# Patient Record
Sex: MALE | Race: WHITE | NOT HISPANIC OR LATINO | Employment: FULL TIME | URBAN - METROPOLITAN AREA
[De-identification: names, ages, dates, MRNs, and addresses within clinical notes are randomized per-mention and may not be internally consistent; named-entity substitution may affect disease eponyms.]

---

## 2021-05-24 ENCOUNTER — HOSPITAL ENCOUNTER (INPATIENT)
Facility: HOSPITAL | Age: 21
LOS: 1 days | Discharge: HOME | End: 2021-05-25
Attending: EMERGENCY MEDICINE | Admitting: INTERNAL MEDICINE

## 2021-05-24 ENCOUNTER — APPOINTMENT (OUTPATIENT)
Dept: MRI IMAGING | Facility: HOSPITAL | Age: 21
End: 2021-05-24

## 2021-05-24 ENCOUNTER — APPOINTMENT (OUTPATIENT)
Dept: CT IMAGING | Facility: HOSPITAL | Age: 21
End: 2021-05-24

## 2021-05-24 DIAGNOSIS — Q28.3: Primary | ICD-10-CM

## 2021-05-24 DIAGNOSIS — R51.9 FRONTAL HEADACHE: ICD-10-CM

## 2021-05-24 DIAGNOSIS — I62.9: Primary | ICD-10-CM

## 2021-05-24 DIAGNOSIS — H53.8 BLURRED VISION, LEFT EYE: ICD-10-CM

## 2021-05-24 LAB
ALBUMIN SERPL-MCNC: 4.6 G/DL (ref 3.5–5.2)
ALBUMIN/GLOB SERPL: 1.8 G/DL
ALP SERPL-CCNC: 57 U/L (ref 39–117)
ALT SERPL W P-5'-P-CCNC: 15 U/L (ref 1–41)
ANION GAP SERPL CALCULATED.3IONS-SCNC: 8 MMOL/L (ref 5–15)
AST SERPL-CCNC: 23 U/L (ref 1–40)
BASOPHILS # BLD AUTO: 0.02 10*3/MM3 (ref 0–0.2)
BASOPHILS NFR BLD AUTO: 0.4 % (ref 0–1.5)
BILIRUB SERPL-MCNC: 0.5 MG/DL (ref 0–1.2)
BUN SERPL-MCNC: 7 MG/DL (ref 6–20)
BUN/CREAT SERPL: 8.1 (ref 7–25)
CALCIUM SPEC-SCNC: 9.7 MG/DL (ref 8.6–10.5)
CHLORIDE SERPL-SCNC: 105 MMOL/L (ref 98–107)
CO2 SERPL-SCNC: 28 MMOL/L (ref 22–29)
CREAT SERPL-MCNC: 0.86 MG/DL (ref 0.76–1.27)
DEPRECATED RDW RBC AUTO: 37.7 FL (ref 37–54)
EOSINOPHIL # BLD AUTO: 0.15 10*3/MM3 (ref 0–0.4)
EOSINOPHIL NFR BLD AUTO: 2.9 % (ref 0.3–6.2)
ERYTHROCYTE [DISTWIDTH] IN BLOOD BY AUTOMATED COUNT: 11.8 % (ref 12.3–15.4)
GFR SERPL CREATININE-BSD FRML MDRD: 111 ML/MIN/1.73
GLOBULIN UR ELPH-MCNC: 2.5 GM/DL
GLUCOSE SERPL-MCNC: 99 MG/DL (ref 65–99)
HCT VFR BLD AUTO: 43.1 % (ref 37.5–51)
HGB BLD-MCNC: 14.9 G/DL (ref 13–17.7)
IMM GRANULOCYTES # BLD AUTO: 0 10*3/MM3 (ref 0–0.05)
IMM GRANULOCYTES NFR BLD AUTO: 0 % (ref 0–0.5)
INR PPP: 0.94 (ref 0.85–1.16)
LYMPHOCYTES # BLD AUTO: 2.21 10*3/MM3 (ref 0.7–3.1)
LYMPHOCYTES NFR BLD AUTO: 42.6 % (ref 19.6–45.3)
MCH RBC QN AUTO: 30.5 PG (ref 26.6–33)
MCHC RBC AUTO-ENTMCNC: 34.6 G/DL (ref 31.5–35.7)
MCV RBC AUTO: 88.1 FL (ref 79–97)
MONOCYTES # BLD AUTO: 0.46 10*3/MM3 (ref 0.1–0.9)
MONOCYTES NFR BLD AUTO: 8.9 % (ref 5–12)
NEUTROPHILS NFR BLD AUTO: 2.35 10*3/MM3 (ref 1.7–7)
NEUTROPHILS NFR BLD AUTO: 45.2 % (ref 42.7–76)
NRBC BLD AUTO-RTO: 0 /100 WBC (ref 0–0.2)
PLATELET # BLD AUTO: 216 10*3/MM3 (ref 140–450)
PMV BLD AUTO: 8.7 FL (ref 6–12)
POTASSIUM SERPL-SCNC: 4.2 MMOL/L (ref 3.5–5.2)
PROT SERPL-MCNC: 7.1 G/DL (ref 6–8.5)
PROTHROMBIN TIME: 12.3 SECONDS (ref 11.4–14.4)
RBC # BLD AUTO: 4.89 10*6/MM3 (ref 4.14–5.8)
SODIUM SERPL-SCNC: 141 MMOL/L (ref 136–145)
WBC # BLD AUTO: 5.19 10*3/MM3 (ref 3.4–10.8)

## 2021-05-24 PROCEDURE — A9577 INJ MULTIHANCE: HCPCS | Performed by: EMERGENCY MEDICINE

## 2021-05-24 PROCEDURE — 85025 COMPLETE CBC W/AUTO DIFF WBC: CPT | Performed by: PHYSICIAN ASSISTANT

## 2021-05-24 PROCEDURE — 80053 COMPREHEN METABOLIC PANEL: CPT | Performed by: PHYSICIAN ASSISTANT

## 2021-05-24 PROCEDURE — 70450 CT HEAD/BRAIN W/O DYE: CPT

## 2021-05-24 PROCEDURE — 85610 PROTHROMBIN TIME: CPT | Performed by: PHYSICIAN ASSISTANT

## 2021-05-24 PROCEDURE — 99284 EMERGENCY DEPT VISIT MOD MDM: CPT

## 2021-05-24 PROCEDURE — 0 GADOBENATE DIMEGLUMINE 529 MG/ML SOLUTION: Performed by: EMERGENCY MEDICINE

## 2021-05-24 PROCEDURE — 70553 MRI BRAIN STEM W/O & W/DYE: CPT

## 2021-05-24 RX ORDER — ACETAMINOPHEN 500 MG
1000 TABLET ORAL ONCE
Status: COMPLETED | OUTPATIENT
Start: 2021-05-24 | End: 2021-05-24

## 2021-05-24 RX ORDER — IBUPROFEN 600 MG/1
600 TABLET ORAL ONCE
Status: COMPLETED | OUTPATIENT
Start: 2021-05-24 | End: 2021-05-24

## 2021-05-24 RX ORDER — SODIUM CHLORIDE 0.9 % (FLUSH) 0.9 %
10 SYRINGE (ML) INJECTION AS NEEDED
Status: DISCONTINUED | OUTPATIENT
Start: 2021-05-24 | End: 2021-05-25 | Stop reason: HOSPADM

## 2021-05-24 RX ADMIN — ACETAMINOPHEN 1000 MG: 500 TABLET, FILM COATED ORAL at 20:05

## 2021-05-24 RX ADMIN — GADOBENATE DIMEGLUMINE 17 ML: 529 INJECTION, SOLUTION INTRAVENOUS at 22:32

## 2021-05-24 RX ADMIN — IBUPROFEN 600 MG: 600 TABLET ORAL at 20:05

## 2021-05-25 VITALS
DIASTOLIC BLOOD PRESSURE: 79 MMHG | TEMPERATURE: 97.9 F | WEIGHT: 185.25 LBS | SYSTOLIC BLOOD PRESSURE: 123 MMHG | RESPIRATION RATE: 16 BRPM | BODY MASS INDEX: 23.77 KG/M2 | OXYGEN SATURATION: 98 % | HEIGHT: 74 IN | HEART RATE: 83 BPM

## 2021-05-25 PROBLEM — Z72.0 TOBACCO USE: Status: ACTIVE | Noted: 2021-05-25

## 2021-05-25 PROBLEM — D18.00 ANGIOMA: Status: ACTIVE | Noted: 2021-05-25

## 2021-05-25 LAB
ANION GAP SERPL CALCULATED.3IONS-SCNC: 7 MMOL/L (ref 5–15)
BUN SERPL-MCNC: 7 MG/DL (ref 6–20)
BUN/CREAT SERPL: 8.8 (ref 7–25)
CALCIUM SPEC-SCNC: 9.3 MG/DL (ref 8.6–10.5)
CHLORIDE SERPL-SCNC: 106 MMOL/L (ref 98–107)
CO2 SERPL-SCNC: 28 MMOL/L (ref 22–29)
CREAT SERPL-MCNC: 0.8 MG/DL (ref 0.76–1.27)
DEPRECATED RDW RBC AUTO: 38.1 FL (ref 37–54)
ERYTHROCYTE [DISTWIDTH] IN BLOOD BY AUTOMATED COUNT: 11.8 % (ref 12.3–15.4)
FLUAV RNA RESP QL NAA+PROBE: NOT DETECTED
FLUBV RNA RESP QL NAA+PROBE: NOT DETECTED
GFR SERPL CREATININE-BSD FRML MDRD: 121 ML/MIN/1.73
GLUCOSE SERPL-MCNC: 100 MG/DL (ref 65–99)
HCT VFR BLD AUTO: 38.5 % (ref 37.5–51)
HGB BLD-MCNC: 13.3 G/DL (ref 13–17.7)
MCH RBC QN AUTO: 31.1 PG (ref 26.6–33)
MCHC RBC AUTO-ENTMCNC: 34.5 G/DL (ref 31.5–35.7)
MCV RBC AUTO: 90.2 FL (ref 79–97)
PLATELET # BLD AUTO: 175 10*3/MM3 (ref 140–450)
PMV BLD AUTO: 9 FL (ref 6–12)
POTASSIUM SERPL-SCNC: 3.9 MMOL/L (ref 3.5–5.2)
RBC # BLD AUTO: 4.27 10*6/MM3 (ref 4.14–5.8)
SARS-COV-2 RNA RESP QL NAA+PROBE: NOT DETECTED
SODIUM SERPL-SCNC: 141 MMOL/L (ref 136–145)
WBC # BLD AUTO: 6.47 10*3/MM3 (ref 3.4–10.8)

## 2021-05-25 PROCEDURE — 99219 PR INITIAL OBSERVATION CARE/DAY 50 MINUTES: CPT | Performed by: INTERNAL MEDICINE

## 2021-05-25 PROCEDURE — 85027 COMPLETE CBC AUTOMATED: CPT | Performed by: NURSE PRACTITIONER

## 2021-05-25 PROCEDURE — 87636 SARSCOV2 & INF A&B AMP PRB: CPT | Performed by: PHYSICIAN ASSISTANT

## 2021-05-25 PROCEDURE — 80048 BASIC METABOLIC PNL TOTAL CA: CPT | Performed by: NURSE PRACTITIONER

## 2021-05-25 PROCEDURE — 99253 IP/OBS CNSLTJ NEW/EST LOW 45: CPT | Performed by: PHYSICIAN ASSISTANT

## 2021-05-25 PROCEDURE — 99217 PR OBSERVATION CARE DISCHARGE MANAGEMENT: CPT | Performed by: INTERNAL MEDICINE

## 2021-05-25 RX ORDER — ACETAMINOPHEN 325 MG/1
650 TABLET ORAL EVERY 4 HOURS PRN
Status: DISCONTINUED | OUTPATIENT
Start: 2021-05-25 | End: 2021-05-25 | Stop reason: HOSPADM

## 2021-05-25 RX ORDER — SODIUM CHLORIDE 0.9 % (FLUSH) 0.9 %
10 SYRINGE (ML) INJECTION AS NEEDED
Status: DISCONTINUED | OUTPATIENT
Start: 2021-05-25 | End: 2021-05-25 | Stop reason: HOSPADM

## 2021-05-25 RX ORDER — ACETAMINOPHEN 160 MG/5ML
650 SOLUTION ORAL EVERY 4 HOURS PRN
Status: DISCONTINUED | OUTPATIENT
Start: 2021-05-25 | End: 2021-05-25 | Stop reason: HOSPADM

## 2021-05-25 RX ORDER — ACETAMINOPHEN 650 MG/1
650 SUPPOSITORY RECTAL EVERY 4 HOURS PRN
Status: DISCONTINUED | OUTPATIENT
Start: 2021-05-25 | End: 2021-05-25 | Stop reason: HOSPADM

## 2021-05-25 RX ORDER — SODIUM CHLORIDE 0.9 % (FLUSH) 0.9 %
10 SYRINGE (ML) INJECTION EVERY 12 HOURS SCHEDULED
Status: DISCONTINUED | OUTPATIENT
Start: 2021-05-25 | End: 2021-05-25 | Stop reason: HOSPADM

## 2021-05-25 RX ADMIN — SODIUM CHLORIDE, PRESERVATIVE FREE 10 ML: 5 INJECTION INTRAVENOUS at 08:22

## 2021-07-08 ENCOUNTER — OFFICE VISIT (OUTPATIENT)
Dept: FAMILY MEDICINE CLINIC | Facility: CLINIC | Age: 21
End: 2021-07-08

## 2021-07-08 VITALS
SYSTOLIC BLOOD PRESSURE: 112 MMHG | DIASTOLIC BLOOD PRESSURE: 68 MMHG | BODY MASS INDEX: 24 KG/M2 | OXYGEN SATURATION: 98 % | RESPIRATION RATE: 16 BRPM | HEART RATE: 61 BPM | HEIGHT: 74 IN | WEIGHT: 187 LBS | TEMPERATURE: 98.5 F

## 2021-07-08 DIAGNOSIS — R41.840 ATTENTION AND CONCENTRATION DEFICIT: ICD-10-CM

## 2021-07-08 DIAGNOSIS — D18.02 VENOUS ANGIOMA OF BRAIN (HCC): Primary | ICD-10-CM

## 2021-07-08 PROCEDURE — 99213 OFFICE O/P EST LOW 20 MIN: CPT | Performed by: FAMILY MEDICINE

## 2021-07-08 NOTE — PROGRESS NOTES
Subjective   Vincent Hartman is a 21 y.o. male    Chief Complaint    Establish care   Health maintenance evaluation     History of Present Illness  The patient reports he transferred to the [facility] [place] and his unit is over in [place]. He reports he has been doing a lot of paperwork. He would like to get his college degree in IT.     On 05/26/2021 the patient noticed his vision was not right. He was seen by Dr. Jefe Reynoso and diagnosed with a venous angioma. The patient was told unless something else happens he does not need follow-up and could resume normal activities.     The patient asked where he could be tested for ADHD.     He reports approximately a week and a half ago he was having some swelling in the anterior aspect of his neck in the area of his lymph nodes.    The following portions of the patient's history were reviewed and updated as appropriate: allergies, current medications, past social history and problem list    Review of Systems   Constitutional: Negative for activity change, appetite change, fatigue and unexpected weight change.   HENT: Negative for congestion, dental problem, postnasal drip, sinus pressure and sore throat.    Eyes: Negative for photophobia, pain and visual disturbance.   Respiratory: Negative for chest tightness.    Cardiovascular: Negative for chest pain and palpitations.   Gastrointestinal: Negative for nausea and vomiting.   Neurological: Positive for headaches. Negative for dizziness, syncope, facial asymmetry, speech difficulty, weakness, light-headedness and numbness.   Psychiatric/Behavioral: Positive for decreased concentration. Negative for agitation, behavioral problems, confusion, dysphoric mood and sleep disturbance. The patient is not nervous/anxious and is not hyperactive.        Objective     Vitals:    07/08/21 1259   BP: 112/68   Pulse: 61   Resp: 16   Temp: 98.5 °F (36.9 °C)   SpO2: 98%       Physical Exam  Vitals and nursing note reviewed.    Constitutional:       Appearance: He is well-developed.   HENT:      Head: Normocephalic and atraumatic.   Eyes:      Conjunctiva/sclera: Conjunctivae normal.      Pupils: Pupils are equal, round, and reactive to light.   Cardiovascular:      Rate and Rhythm: Normal rate and regular rhythm.      Heart sounds: Normal heart sounds.   Pulmonary:      Effort: Pulmonary effort is normal.   Musculoskeletal:      Cervical back: Normal range of motion and neck supple.   Neurological:      Mental Status: He is alert and oriented to person, place, and time.      Cranial Nerves: No cranial nerve deficit.      Sensory: No sensory deficit.   Psychiatric:         Attention and Perception: He is attentive.         Speech: Speech normal.         Behavior: Behavior normal.         Thought Content: Thought content normal.         Judgment: Judgment normal.         Assessment/Plan   Problems Addressed this Visit     None      Visit Diagnoses     Venous angioma of brain (CMS/[facility])    -  Primary    Attention and concentration deficit          Diagnoses       Codes Comments    Venous angioma of brain (CMS/HCC)    -  Primary ICD-10-CM: D18.02  ICD-9-CM: 228.02     Attention and concentration deficit     ICD-10-CM: R41.840  ICD-9-CM: 799.51         Recommend formal psychological testing to rule out treatable ADD.  Patient to try to contact [facility].  If he needs a referral he is to notify us.       Scribed for SAHRA Castorena MD by Tish Weeks.  07/08/21   13:53 EDT    I have personally performed the services described in this document as scribed by the above individual, and it is both accurate and complete.  SAHRA Casotrena MD  7/8/2021  16:40 EDT

## 2022-01-06 ENCOUNTER — OFFICE VISIT (OUTPATIENT)
Dept: FAMILY MEDICINE CLINIC | Facility: CLINIC | Age: 22
End: 2022-01-06

## 2022-01-06 VITALS
OXYGEN SATURATION: 99 % | WEIGHT: 203.2 LBS | HEART RATE: 111 BPM | SYSTOLIC BLOOD PRESSURE: 122 MMHG | TEMPERATURE: 100.2 F | BODY MASS INDEX: 26.08 KG/M2 | DIASTOLIC BLOOD PRESSURE: 76 MMHG | RESPIRATION RATE: 14 BRPM | HEIGHT: 74 IN

## 2022-01-06 DIAGNOSIS — J01.00 ACUTE NON-RECURRENT MAXILLARY SINUSITIS: Primary | ICD-10-CM

## 2022-01-06 DIAGNOSIS — R50.9 FEVER, UNSPECIFIED FEVER CAUSE: ICD-10-CM

## 2022-01-06 PROCEDURE — 99213 OFFICE O/P EST LOW 20 MIN: CPT | Performed by: PHYSICIAN ASSISTANT

## 2022-01-06 RX ORDER — PREDNISONE 20 MG/1
20 TABLET ORAL 2 TIMES DAILY
Qty: 14 TABLET | Refills: 0 | Status: SHIPPED | OUTPATIENT
Start: 2022-01-06 | End: 2023-03-10

## 2022-01-06 RX ORDER — CEFDINIR 300 MG/1
300 CAPSULE ORAL 2 TIMES DAILY
Qty: 20 CAPSULE | Refills: 0 | Status: SHIPPED | OUTPATIENT
Start: 2022-01-06 | End: 2023-03-10

## 2022-01-06 NOTE — PROGRESS NOTES
Subjective   Vincent Hartman is a 21 y.o. male  Fever (102-103), Generalized Body Aches, Headache, and Fatigue      History of Present Illness  Patient is a pleasant 19-year-old white male who comes in complaining of fever chills body aches cough is productive with yellow sputum patient had a fever over 100 he is not been tested for Covid but has had his vaccine, mild sore throat nasal pressure nasal congestion no shortness of breath no chest pain  The following portions of the patient's history were reviewed and updated as appropriate: allergies, current medications, past social history and problem list    Review of Systems   Constitutional: Negative for chills, fatigue and fever.   HENT: Positive for congestion, ear pain, postnasal drip, rhinorrhea and sinus pressure. Negative for sore throat.    Eyes: Positive for discharge and itching. Negative for pain.   Respiratory: Positive for cough. Negative for shortness of breath.    Gastrointestinal: Negative.    Genitourinary: Negative.    Musculoskeletal: Negative for myalgias.   Neurological: Positive for light-headedness and headaches. Negative for dizziness.   Hematological: Negative for adenopathy.   Psychiatric/Behavioral: Positive for sleep disturbance.       Objective     Vitals:    01/06/22 0942   BP: 122/76   Pulse: 111   Resp: 14   Temp: 100.2 °F (37.9 °C)   SpO2: 99%       Physical Exam  Vitals and nursing note reviewed.   Constitutional:       Appearance: He is well-developed.   HENT:      Head: Normocephalic and atraumatic.      Right Ear: Tympanic membrane and ear canal normal.      Left Ear: Tympanic membrane and ear canal normal.      Nose: Mucosal edema and rhinorrhea present.      Right Sinus: Maxillary sinus tenderness and frontal sinus tenderness present.      Left Sinus: Maxillary sinus tenderness and frontal sinus tenderness present.      Mouth/Throat:      Pharynx: No oropharyngeal exudate.   Eyes:      Pupils: Pupils are equal, round, and  reactive to light.   Cardiovascular:      Rate and Rhythm: Normal rate and regular rhythm.   Pulmonary:      Effort: Pulmonary effort is normal.      Breath sounds: Normal breath sounds.         Assessment/Plan     Diagnoses and all orders for this visit:    1. Acute non-recurrent maxillary sinusitis (Primary)  -     cefdinir (OMNICEF) 300 MG capsule; Take 1 capsule by mouth 2 (Two) Times a Day.  Dispense: 20 capsule; Refill: 0  -     predniSONE (DELTASONE) 20 MG tablet; Take 1 tablet by mouth 2 (Two) Times a Day.  Dispense: 14 tablet; Refill: 0    2. Fever, unspecified fever cause     I spent 15 minutes in patient care: Reviewing records prior to the visit, examining the patient, entering orders and documentation    Part of this note may be an electronic transcription/translation of spoken language to printed text using the Dragon Dictation System.

## 2023-03-10 ENCOUNTER — OFFICE VISIT (OUTPATIENT)
Dept: FAMILY MEDICINE CLINIC | Facility: CLINIC | Age: 23
End: 2023-03-10
Payer: OTHER GOVERNMENT

## 2023-03-10 VITALS
DIASTOLIC BLOOD PRESSURE: 74 MMHG | HEART RATE: 69 BPM | RESPIRATION RATE: 15 BRPM | WEIGHT: 196.4 LBS | HEIGHT: 74 IN | BODY MASS INDEX: 25.21 KG/M2 | OXYGEN SATURATION: 98 % | SYSTOLIC BLOOD PRESSURE: 120 MMHG | TEMPERATURE: 97.5 F

## 2023-03-10 DIAGNOSIS — D18.02 VENOUS ANGIOMA OF BRAIN: Primary | ICD-10-CM

## 2023-03-10 PROCEDURE — 99213 OFFICE O/P EST LOW 20 MIN: CPT | Performed by: FAMILY MEDICINE

## 2023-03-13 NOTE — PROGRESS NOTES
Subjective   Vincent Hartman is a 23 y.o. male    Chief Complaint    History of cerebral hemorrhage    History of Present Illness  The patient has a history of a spontaneous intracranial bleed that was described as quite small, although he was symptomatic with some vision symptoms and headache at the time. It was determined that this was a small venous angioma that required no further intervention. 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 My suggestion is consultation again with the neurosurgery group, and they can either do an another scan or make whatever determination is necessary to give permission for him to be deployed.    The patient said they need to know that he is all clear, but the issue is over at [facility], from his understanding their doctors are not authorized to do it, and he has to go through primary medical care. Because of that, they are just trying to get this knocked out in advance, so they can submit it because it takes approximately 1.5 months for any medical paper to go through the army. The patient said he has not had any headaches. The patient is active duty currently.     The following portions of the patient's history were reviewed and updated as appropriate: allergies, current medications, past social history and problem list    Review of Systems   Constitutional: Negative for fatigue and unexpected weight change.   HENT: Negative for congestion, dental problem, postnasal drip, sinus pressure and sore throat.    Eyes: Negative for photophobia, pain and visual disturbance.   Gastrointestinal: Negative for nausea and vomiting.   Neurological: Positive for headaches. Negative for dizziness, syncope, facial asymmetry, speech difficulty, weakness, light-headedness and numbness.   Psychiatric/Behavioral: Negative for agitation, confusion, dysphoric mood and sleep disturbance. The patient is  not nervous/anxious.        Objective     Vitals:    03/10/23 1524   BP: 120/74   Pulse: 69   Resp: 15   Temp: 97.5 °F (36.4 °C)   SpO2: 98%       Physical Exam  Vitals and nursing note reviewed.   Constitutional:       General: He is not in acute distress.     Appearance: Normal appearance. He is well-developed. He is not ill-appearing, toxic-appearing or diaphoretic.   HENT:      Head: Normocephalic and atraumatic.   Eyes:      Conjunctiva/sclera: Conjunctivae normal.      Pupils: Pupils are equal, round, and reactive to light.   Cardiovascular:      Rate and Rhythm: Normal rate and regular rhythm.   Pulmonary:      Effort: Pulmonary effort is normal.   Musculoskeletal:      Cervical back: Normal range of motion and neck supple. No rigidity.   Neurological:      Mental Status: He is alert and oriented to person, place, and time.      Cranial Nerves: No cranial nerve deficit.      Sensory: No sensory deficit.      Motor: No weakness.      Coordination: Coordination normal.   Psychiatric:         Mood and Affect: Mood normal.         Speech: Speech normal.         Behavior: Behavior normal.         Thought Content: Thought content normal.         Judgment: Judgment normal.         Assessment & Plan   Problems Addressed this Visit    None  Visit Diagnoses     Venous angioma of brain (HCC)    -  Primary    Relevant Orders    Ambulatory Referral to Neurosurgery      Diagnoses       Codes Comments    Venous angioma of brain (HCC)    -  Primary ICD-10-CM: D18.02  ICD-9-CM: 228.02         I spent 15 minutes in patient care: Reviewing records prior to the visit, examining the patient, entering orders and documentation    Part of this note may be an electronic transcription/translation of spoken language to printed text using the Dragon Dictation System.    Transcribed from ambient dictation for SAHRA Castorena MD by Madelyn Akbar.  03/13/23   09:16 EDT    Patient or patient representative verbalized consent to the  visit recording.  I have personally performed the services described in this document as transcribed by the above individual, and it is both accurate and complete.

## 2023-03-14 ENCOUNTER — TELEPHONE (OUTPATIENT)
Dept: FAMILY MEDICINE CLINIC | Facility: CLINIC | Age: 23
End: 2023-03-14
Payer: OTHER GOVERNMENT

## 2023-03-14 ENCOUNTER — TRANSCRIBE ORDERS (OUTPATIENT)
Dept: FAMILY MEDICINE CLINIC | Facility: CLINIC | Age: 23
End: 2023-03-14
Payer: OTHER GOVERNMENT

## 2023-03-14 DIAGNOSIS — D18.02 VENOUS ANGIOMA OF BRAIN: Primary | ICD-10-CM

## 2023-03-29 ENCOUNTER — HOSPITAL ENCOUNTER (OUTPATIENT)
Dept: MRI IMAGING | Facility: HOSPITAL | Age: 23
Discharge: HOME | End: 2023-03-29
Admitting: FAMILY MEDICINE
Payer: OTHER GOVERNMENT

## 2023-03-29 DIAGNOSIS — D18.02 VENOUS ANGIOMA OF BRAIN: ICD-10-CM

## 2023-03-29 PROCEDURE — 70553 MRI BRAIN STEM W/O & W/DYE: CPT

## 2023-03-29 PROCEDURE — A9577 INJ MULTIHANCE: HCPCS | Performed by: FAMILY MEDICINE

## 2023-03-29 PROCEDURE — 0 GADOBENATE DIMEGLUMINE 529 MG/ML SOLUTION: Performed by: FAMILY MEDICINE

## 2023-03-29 RX ADMIN — GADOBENATE DIMEGLUMINE 18 ML: 529 INJECTION, SOLUTION INTRAVENOUS at 11:57

## 2023-03-30 ENCOUNTER — TELEPHONE (OUTPATIENT)
Dept: FAMILY MEDICINE CLINIC | Facility: CLINIC | Age: 23
End: 2023-03-30
Payer: OTHER GOVERNMENT

## 2023-03-30 NOTE — TELEPHONE ENCOUNTER
----- Message from Vinay Castorena MD sent at 3/29/2023  2:09 PM EDT -----  Here is the MRI on Vincent.  Hopefully they will see him in neurosurgery soon

## 2023-04-12 ENCOUNTER — OFFICE VISIT (OUTPATIENT)
Dept: NEUROSURGERY | Facility: CLINIC | Age: 23
End: 2023-04-12
Payer: OTHER GOVERNMENT

## 2023-04-12 VITALS — BODY MASS INDEX: 25.1 KG/M2 | WEIGHT: 195.6 LBS | HEIGHT: 74 IN

## 2023-04-12 DIAGNOSIS — Q28.3 CAVERNOUS MALFORMATION: Primary | ICD-10-CM

## 2023-04-12 PROCEDURE — 99214 OFFICE O/P EST MOD 30 MIN: CPT | Performed by: NEUROLOGICAL SURGERY

## 2023-04-12 NOTE — PROGRESS NOTES
"Subjective     Chief Complaint: Abnormal brain MRI    Patient ID: Vincent Hartman is a 23 y.o. male seen for consultation today at the request of  Vinay Light*    History of Present Illness    This is a 23-year-old man with a known history of a left posterior frontal lobe cavernous malformation.  This was discovered in 2021 when he presented to the emergency department with sudden onset headache and visual changes.  He has not had any similar episodes since that time.  He denies any strokelike symptoms, or seizures.  The reason he was referred to my office is because the surgeon and his [employer] unit was concerned that there had not been any follow-up for his abnormal brain MRI and he refused to clear him for deployment until he visited with a neurologist/neurosurgeon.  He does not have any significant medical comorbidities.    The following portions of the patient's history were reviewed and updated as appropriate: allergies, current medications, past family history, past medical history, past social history, past surgical history and problem list.    Family history:   Family History   Problem Relation Age of Onset   • No Known Problems Mother        Social history:   Social History     Socioeconomic History   • Marital status: Single   Tobacco Use   • Smoking status: Never   • Smokeless tobacco: Former     Types: Snuff     Quit date: 2023   • Tobacco comments:     1/8 can    Vaping Use   • Vaping Use: Never used   Substance and Sexual Activity   • Alcohol use: Not Currently     Alcohol/week: 0.0 standard drinks     Comment: 2 per week   • Drug use: Never   • Sexual activity: Not Currently     Partners: Female     Birth control/protection: Condom, Spermicide       Review of Systems    Objective   Height 188 cm (74\"), weight 88.7 kg (195 lb 9.6 oz).  Body mass index is 25.11 kg/m².    Physical Exam  Vitals reviewed.   Constitutional:       General: He is not in acute distress.     Appearance: He " is well-developed. He is not diaphoretic.   HENT:      Head: Normocephalic and atraumatic.   Pulmonary:      Effort: Pulmonary effort is normal.   Skin:     General: Skin is warm and dry.   Neurological:      Mental Status: He is alert and oriented to person, place, and time.      Comments: He is conversant, pleasant, appropriate, and a good historian.  He has no pronator drift.  Finger-nose testing is performed without dysmetria. cranial nerves II through XII are grossly intact.  Casual gait is unremarkable, and station is intact.   Psychiatric:         Behavior: Behavior normal.         Assessment & Plan     Independent Review of Radiographic Studies:      Available for my review is a MRI of the brain which was performed on March 29, 2023.  A comparison study from 5/24/2021 is also available for my review.  There is a small lesion situated within the subcortical white matter in the posterior left frontal lobe.  There is associated contrast-enhancement and a characteristic spider angioma type pattern.  This is entirely consistent with a cavernous malformation.  There is some susceptibility artifact on the GRE sequences suggestive of a prior hemorrhage.  Otherwise, I do not appreciate any concerning or pathological findings on this MRI.  There is no appreciable change in comparison to the MRI from 2 years ago    Medical Decision Making:      This is a 23-year-old man with a cavernous malformation that appears to have had at least one bleeding episode back in 2021.  There is no indication for surgical intervention.  There is no need for surveillance imaging.  From my perspective, he is cleared to resume all normal activities including any activities that might be required of him for his [employer] service.  I would be happy to follow-up with him on an as-needed basis moving forward.  Signs and symptoms of intracranial hemorrhage, stroke, mass effect, and cortical irritation were discussed with the  patient    Diagnoses and all orders for this visit:    1. Cavernous malformation (Primary)        No follow-ups on file.           This document signed by CHRISTIANO Reynoso MD April 12, 2023 14:20 EDT

## 2024-05-08 ENCOUNTER — TELEPHONE (OUTPATIENT)
Dept: FAMILY MEDICINE CLINIC | Facility: CLINIC | Age: 24
End: 2024-05-08
Payer: OTHER GOVERNMENT